# Patient Record
Sex: FEMALE | Race: WHITE | HISPANIC OR LATINO | Employment: UNEMPLOYED | URBAN - METROPOLITAN AREA
[De-identification: names, ages, dates, MRNs, and addresses within clinical notes are randomized per-mention and may not be internally consistent; named-entity substitution may affect disease eponyms.]

---

## 2023-05-17 ENCOUNTER — OFFICE VISIT (OUTPATIENT)
Dept: PRIMARY CARE CLINIC | Facility: CLINIC | Age: 58
End: 2023-05-17
Payer: OTHER GOVERNMENT

## 2023-05-17 VITALS
WEIGHT: 143.88 LBS | TEMPERATURE: 97 F | BODY MASS INDEX: 25.49 KG/M2 | SYSTOLIC BLOOD PRESSURE: 100 MMHG | HEIGHT: 63 IN | HEART RATE: 73 BPM | RESPIRATION RATE: 16 BRPM | DIASTOLIC BLOOD PRESSURE: 62 MMHG | OXYGEN SATURATION: 96 %

## 2023-05-17 DIAGNOSIS — Z87.09: ICD-10-CM

## 2023-05-17 DIAGNOSIS — Z72.0 TOBACCO ABUSE: ICD-10-CM

## 2023-05-17 DIAGNOSIS — F32.A DEPRESSION, UNSPECIFIED DEPRESSION TYPE: ICD-10-CM

## 2023-05-17 DIAGNOSIS — Z11.59 NEED FOR HEPATITIS C SCREENING TEST: ICD-10-CM

## 2023-05-17 DIAGNOSIS — R00.2 PALPITATIONS: ICD-10-CM

## 2023-05-17 DIAGNOSIS — E74.819 DISORDERS OF GLUCOSE TRANSPORT, UNSPECIFIED: ICD-10-CM

## 2023-05-17 DIAGNOSIS — F41.1 GENERALIZED ANXIETY DISORDER: ICD-10-CM

## 2023-05-17 DIAGNOSIS — Z00.00 ANNUAL PHYSICAL EXAM: ICD-10-CM

## 2023-05-17 DIAGNOSIS — Z78.0 MENOPAUSE: ICD-10-CM

## 2023-05-17 DIAGNOSIS — Z11.4 ENCOUNTER FOR SCREENING FOR HIV: ICD-10-CM

## 2023-05-17 DIAGNOSIS — Z76.89 ENCOUNTER TO ESTABLISH CARE: Primary | ICD-10-CM

## 2023-05-17 DIAGNOSIS — G47.00 INSOMNIA, UNSPECIFIED TYPE: ICD-10-CM

## 2023-05-17 DIAGNOSIS — R23.2 HOT FLASHES: ICD-10-CM

## 2023-05-17 PROCEDURE — 99999 PR PBB SHADOW E&M-NEW PATIENT-LVL V: ICD-10-PCS | Mod: PBBFAC,,, | Performed by: STUDENT IN AN ORGANIZED HEALTH CARE EDUCATION/TRAINING PROGRAM

## 2023-05-17 PROCEDURE — 93010 ELECTROCARDIOGRAM REPORT: CPT | Mod: S$PBB,,, | Performed by: INTERNAL MEDICINE

## 2023-05-17 PROCEDURE — 99204 OFFICE O/P NEW MOD 45 MIN: CPT | Mod: S$PBB,,, | Performed by: STUDENT IN AN ORGANIZED HEALTH CARE EDUCATION/TRAINING PROGRAM

## 2023-05-17 PROCEDURE — 93010 EKG 12-LEAD: ICD-10-PCS | Mod: S$PBB,,, | Performed by: INTERNAL MEDICINE

## 2023-05-17 PROCEDURE — 99204 PR OFFICE/OUTPT VISIT, NEW, LEVL IV, 45-59 MIN: ICD-10-PCS | Mod: S$PBB,,, | Performed by: STUDENT IN AN ORGANIZED HEALTH CARE EDUCATION/TRAINING PROGRAM

## 2023-05-17 PROCEDURE — 93005 ELECTROCARDIOGRAM TRACING: CPT | Mod: PBBFAC,PN | Performed by: INTERNAL MEDICINE

## 2023-05-17 PROCEDURE — 99999 PR PBB SHADOW E&M-NEW PATIENT-LVL V: CPT | Mod: PBBFAC,,, | Performed by: STUDENT IN AN ORGANIZED HEALTH CARE EDUCATION/TRAINING PROGRAM

## 2023-05-17 PROCEDURE — 99205 OFFICE O/P NEW HI 60 MIN: CPT | Mod: PBBFAC,PN | Performed by: STUDENT IN AN ORGANIZED HEALTH CARE EDUCATION/TRAINING PROGRAM

## 2023-05-17 RX ORDER — FLUOXETINE HYDROCHLORIDE 20 MG/1
20 CAPSULE ORAL EVERY MORNING
COMMUNITY
Start: 2023-04-21

## 2023-05-17 RX ORDER — BUSPIRONE HYDROCHLORIDE 10 MG/1
10 TABLET ORAL 2 TIMES DAILY
COMMUNITY
Start: 2023-01-09

## 2023-05-17 RX ORDER — ALPRAZOLAM 1 MG/1
1 TABLET ORAL NIGHTLY PRN
COMMUNITY
Start: 2023-04-10 | End: 2023-05-17

## 2023-05-17 RX ORDER — ALPRAZOLAM 1 MG/1
TABLET ORAL
Qty: 24 TABLET | Refills: 0 | Status: SHIPPED | OUTPATIENT
Start: 2023-05-17

## 2023-05-17 NOTE — PATIENT INSTRUCTIONS
SELINA/Depression:   - patient has significant and poorly controlled anxiety.   - she demonstrates difficulty controlling stress.  Eminates a litany of stressors in her life during interview.  Speaks of stress/anxiety about 's valentina during his appt; about her health during her appt.  Speaks of stress/anxiety relating to her parents/upbringing, in her previous marriages, in her kids step-parents or biological parents.    - Stress is affecting her with GI manifestations, palpitations, insomnia.  Has scattered thoughts.  Shows difficulty remaining on a point during interview.   - states previous psychiatrist in [place] told her she was doing well, would advise return to quality therapy to get back to doing well.    - taking prozac 20mg daily, Buspar 10mg BID.    - has been taking Xanax 1mg to 0.5mg nightly for sleep.    - advised against long term or daily Benzo use as rarely effective over long term.  But possibly helpful as PRN use for panic issues, which is what she was advised to use med for in this appt.    Chest Pain/palpitations:   - having intermittent chest pain.   - EKG reassuring.  High anxiety which may be related.   - will first check thyroid and cortisol. Consider Holter or Stress test in future.    - ddx: anxiety, angina, arrhythmia, GERD, costochondritis, hyperthyroidism.    Insomnia:   - patient has poor sleep schedule.  Interrupted, drinking caffine in middle of sleep cycle, watching TV in sleep cycle.     - high anxiety contributing.    - suggest complete re-working of her approach to sleep.   - states does not want a sedative med for sleep; yet has been using Xanax nightly.     - advised will not be giving xanax for nightly use.  Last fill was on 4/10/23.   - patient states melatonin does not work, which is not surprising given hx of regular benzo use.      Hx Lung Lesion:   - patient states was previosly found to have lung findings that had been addressed.  Current smoker, would advise getting  low dose CT.    Hot Flashes/menopause:   - referral to OB to discuss menopause.   - having hot/cold flashes.   - would recommend continued use of SSRI as can be helpful.    Tobacco use:   - advise cessation.    SLEEP HYGIENE-   After 3 pm - Avoid caffeine (coffee, tea, soft drinks ,energy drinks)   After 6 pm - Avoid liquids (so you're not waking up to urinate)  After 8 pm - Avoid screens that emit light & stimulate your brain. Don't look at phone, computer or TV  It's ok to listen to background noise machine (waves, rain, audio books where voice is low)  Keep your room dark & use an alarm clock that isn't bright.   Do not take a nap  Exercise your body for 20 minutes EVERY DAY , so it's exhausted to sleep    Try to stay in your bed for 7-8 hours per night, instead of getting out of bed (which awakens your body when you're standing upright &  wakes up your brain, affecting your normal circadian rhythm)  ================    We are no longer prescribing sleep medications - Ambien, Lunesta, Sonata, Restoril -- because of studies showing a possible increased risk of death.     You can try these safe over the counter sleep aids-   Tranquil Sleep by Sleep Relax (5HTP, Suntheanine & Melatonin),  Prosom, Tylenol PM, Melatonin. Aerobic exercise helps to exhaust the body & relax into deeper sleep.     Please make an appointment if you'd like to discuss a prescription medications that could also be helpful such as Trazodone . It is a low dose of serotonin medication (similar to Lexapro, Celexa, Zoloft, Prozac) , mainly helping to calm our thoughts & worries & lists of things going through our head that keep us from either going to sleep or sleeping restfully

## 2023-05-17 NOTE — PROGRESS NOTES
Subjective:           Patient ID: Lida Palumbo is a 57 y.o. female who presents today with a chief complaint of est care, annual exam.    Chief Complaint:   Establish Care and Annual Exam      History of Present Illness:    56yo female presenting to est care.    Hx anxiety.  Hx of Buspar, Prozac and Xanax use.  As well as individual therapy per her last provider notes.   Has seen a psychiatrist before, states felt was doing well.      Appt with [facility] shows rx addressing anxiety and reactive depression; including Fluoxitine (Prozac) 20mg daily in AM, Buspar 10mg BID, and Xanax 1mg PRN for sleep.    Patient states she takes 1/2 a tablet in the evening.  She gets to bed at 2AM and up by 4:30AM.  Watches TV to 8AM w/ coffee and sleeps from 8-9:30AM.    States that she has been a bit more weepy or emotional person.    Which is not typical for her.     Has 6 kids between patient and spouse.  Has lots of drama between the kids.     States gets GERD, GI upset with stress.    Does feel that anxiety worsens these issues.     States was  4 times and  3 times.  But doing well now.     Has been having hot flashes, causing more irritation.     Gets heart palpitations with blurry vision and shakes.       Review of Systems   Constitutional:  Positive for diaphoresis. Negative for activity change, fatigue, fever and unexpected weight change.        Hot flashes   HENT:  Negative for congestion, nosebleeds, sinus pressure and sneezing.    Respiratory:  Negative for cough, shortness of breath and wheezing.    Cardiovascular:  Positive for chest pain (at times) and palpitations. Negative for leg swelling.   Gastrointestinal:  Positive for abdominal pain. Negative for abdominal distention, constipation, diarrhea and nausea.   Genitourinary:  Negative for difficulty urinating and dysuria.   Musculoskeletal:  Negative for back pain and gait problem.   Skin:  Negative for pallor and rash.   Neurological:   "Negative for weakness, numbness and headaches.   Psychiatric/Behavioral:  Positive for agitation, decreased concentration and sleep disturbance. Negative for self-injury and suicidal ideas. The patient is nervous/anxious.          Objective:        Vitals:    05/17/23 1347   BP: 100/62   BP Location: Right arm   Patient Position: Sitting   BP Method: Medium (Manual)   Pulse: 73   Resp: 16   Temp: 97.4 °F (36.3 °C)   TempSrc: Temporal   SpO2: 96%   Weight: 65.2 kg (143 lb 13.6 oz)   Height: 5' 3" (1.6 m)       Body mass index is 25.48 kg/m².        Physical Exam  Constitutional:       General: She is not in acute distress.     Appearance: Normal appearance. She is not ill-appearing.   HENT:      Head: Normocephalic and atraumatic.      Right Ear: External ear normal.      Left Ear: External ear normal.      Nose: No rhinorrhea.      Mouth/Throat:      Mouth: Mucous membranes are moist.      Pharynx: Posterior oropharyngeal erythema (mild) present.   Eyes:      Extraocular Movements: Extraocular movements intact.      Conjunctiva/sclera: Conjunctivae normal.   Cardiovascular:      Rate and Rhythm: Normal rate and regular rhythm.      Pulses: Normal pulses.      Heart sounds: No murmur heard.  Pulmonary:      Effort: Pulmonary effort is normal. No respiratory distress.      Breath sounds: No wheezing.   Abdominal:      Tenderness: There is no abdominal tenderness. There is no right CVA tenderness or left CVA tenderness.   Musculoskeletal:      Right lower leg: No edema.      Left lower leg: No edema.   Lymphadenopathy:      Cervical: No cervical adenopathy.   Skin:     Capillary Refill: Capillary refill takes less than 2 seconds.      Coloration: Skin is not jaundiced.      Findings: No bruising.      Comments: Single 1mm longitudinal line of darker coloration on right great toe nail.   Neurological:      General: No focal deficit present.      Mental Status: She is alert and oriented to person, place, and time.      " Motor: No weakness.      Gait: Gait normal.   Psychiatric:      Comments: Patient speaking of stressors and anxiety constantly.  Is anxious about her health, husbands health.  Stress/anxiety affect her sleep, her digestion, her heart rate.  Has familial issues that cause stress and are caused by stress.      Relates long line of issues related to her stress through the appt.            No results found for: NA, K, CL, CO2, BUN, CREATININE, GLUCOSE, ANIONGAP  Lab Results   Component Value Date    HGBA1C 5.5 09/26/2022     No results found for: BNP, BNPTRIAGEBLO    No results found for: WBC, HGB, HCT, PLT, GRAN  No results found for: CHOL, HDL, LDLCALC, TRIG       Current Outpatient Medications:     busPIRone (BUSPAR) 10 MG tablet, Take 10 mg by mouth 2 (two) times a day., Disp: , Rfl:     FLUoxetine 20 MG capsule, Take 20 mg by mouth every morning., Disp: , Rfl:     ALPRAZolam (XANAX) 1 MG tablet, Use up to 8 doses per month, 24 doses for 3 month supply., Disp: 24 tablet, Rfl: 0     Outpatient Encounter Medications as of 5/17/2023   Medication Sig Dispense Refill    busPIRone (BUSPAR) 10 MG tablet Take 10 mg by mouth 2 (two) times a day.      FLUoxetine 20 MG capsule Take 20 mg by mouth every morning.      [DISCONTINUED] ALPRAZolam (XANAX) 1 MG tablet Take 1 mg by mouth nightly as needed.      ALPRAZolam (XANAX) 1 MG tablet Use up to 8 doses per month, 24 doses for 3 month supply. 24 tablet 0     No facility-administered encounter medications on file as of 5/17/2023.          Assessment:       1. Encounter to establish care    2. Palpitations    3. Annual physical exam    4. Generalized anxiety disorder    5. Insomnia, unspecified type    6. Tobacco abuse    7. Personal history of lung disease    8. Depression, unspecified depression type    9. Hot flashes    10. 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 Encounter for screening for HIV           Plan:        Encounter to establish care  -     EKG 12-lead    Palpitations  -     CBC Auto Differential; Future; Expected date: 05/17/2023  -     Comprehensive Metabolic Panel; Future; Expected date: 05/17/2023  -     Lipid Panel; Future; Expected date: 05/17/2023  -     Hemoglobin A1C; Future; Expected date: 05/17/2023  -     TSH; Future; Expected date: 05/17/2023  -     T4, Free; Future; Expected date: 05/17/2023  -     CORTISOL, 8AM; Future; Expected date: 05/17/2023    Annual physical exam  -     EKG 12-lead  -     CBC Auto Differential; Future; Expected date: 05/17/2023  -     Comprehensive Metabolic Panel; Future; Expected date: 05/17/2023  -     Lipid Panel; Future; Expected date: 05/17/2023  -     Hemoglobin A1C; Future; Expected date: 05/17/2023  -     TSH; Future; Expected date: 05/17/2023  -     T4, Free; Future; Expected date: 05/17/2023    Generalized anxiety disorder  -     CBC Auto Differential; Future; Expected date: 05/17/2023  -     Comprehensive Metabolic Panel; Future; Expected date: 05/17/2023  -     Lipid Panel; Future; Expected date: 05/17/2023  -     TSH; Future; Expected date: 05/17/2023  -     T4, Free; Future; Expected date: 05/17/2023  -     CORTISOL, 8AM; Future; Expected date: 05/17/2023  -     ALPRAZolam (XANAX) 1 MG tablet; Use up to 8 doses per month, 24 doses for 3 month supply.  Dispense: 24 tablet; Refill: 0  -     Ambulatory referral/consult to Psychiatry; Future; Expected date: 05/24/2023    Insomnia, unspecified type  -     TSH; Future; Expected date: 05/17/2023  -     T4, Free; Future; Expected date: 05/17/2023  -     CORTISOL, 8AM; Future; Expected date: 05/17/2023  -     ALPRAZolam (XANAX) 1 MG tablet; Use up to 8 doses per month, 24 doses for 3 month supply.  Dispense: 24 tablet; Refill: 0  -     Ambulatory referral/consult to Psychiatry; Future; Expected date: 05/24/2023    Tobacco abuse  -     CT Chest Lung Screening Low Dose; Future; Expected date: 05/17/2023    Personal history  of lung disease    Depression, unspecified depression type    Hot flashes    Need for hepatitis C screening test  -     Hepatitis C Antibody; Future; Expected date: 05/17/2023    Disorders of glucose transport, unspecified  -     Hemoglobin A1C; Future; Expected date: 05/17/2023    Encounter for screening for HIV  -     HIV 1/2 Ag/Ab (4th Gen); Future; Expected date: 05/17/2023               SELINA/Depression:   - patient has significant and poorly controlled anxiety.   - she demonstrates difficulty controlling stress.  Eminates a litany of stressors in her life during interview.  Speaks of stress/anxiety about 's valentina during his appt; about her health during her appt.  Speaks of stress/anxiety relating to her parents/upbringing, in her previous marriages, in her kids step-parents or biological parents.    - Stress is affecting her with GI manifestations, palpitations, insomnia.  Has scattered thoughts.  Shows difficulty remaining on a point during interview.   - states previous psychiatrist in [place] told her she was doing well, would advise return to quality therapy to get back to doing well.    - taking prozac 20mg daily, Buspar 10mg BID.    - has been taking Xanax 1mg to 0.5mg nightly for sleep.    - advised against long term or daily Benzo use as rarely effective over long term.  But possibly helpful as PRN use for panic issues, which is what she was advised to use med for in this appt.    Chest Pain/palpitations:   - having intermittent chest pain.   - EKG reassuring.  High anxiety which may be related.   - will first check thyroid and cortisol. Consider Holter or Stress test in future.    - ddx: anxiety, angina, arrhythmia, GERD, costochondritis, hyperthyroidism.    Insomnia:   - patient has poor sleep schedule.  Interrupted, drinking caffine in middle of sleep cycle, watching TV in sleep cycle.     - high anxiety contributing.    - suggest complete re-working of her approach to sleep.   - states does not  want a sedative med for sleep; yet has been using Xanax nightly.     - advised will not be giving xanax for nightly use.  Last fill was on 4/10/23.   - patient states melatonin does not work, which is not surprising given hx of regular benzo use.      Hx Lung Lesion:   - patient states was previosly found to have lung findings that had been addressed.  Current smoker, would advise getting low dose CT.    Hot Flashes/menopause:   - referral to OB to discuss menopause.   - having hot/cold flashes.   - would recommend continued use of SSRI as can be helpful.    Tobacco use:   - advise cessation.    SLEEP HYGIENE-   After 3 pm - Avoid caffeine (coffee, tea, soft drinks ,energy drinks)   After 6 pm - Avoid liquids (so you're not waking up to urinate)  After 8 pm - Avoid screens that emit light & stimulate your brain. Don't look at phone, computer or TV  It's ok to listen to background noise machine (waves, rain, audio books where voice is low)  Keep your room dark & use an alarm clock that isn't bright.   Do not take a nap  Exercise your body for 20 minutes EVERY DAY , so it's exhausted to sleep    Try to stay in your bed for 7-8 hours per night, instead of getting out of bed (which awakens your body when you're standing upright &  wakes up your brain, affecting your normal circadian rhythm)  ================    We are no longer prescribing sleep medications - Ambien, Lunesta, Sonata, Restoril -- because of studies showing a possible increased risk of death.     You can try these safe over the counter sleep aids-   Tranquil Sleep by Sleep Relax (5HTP, Suntheanine & Melatonin),  Prosom, Tylenol PM, Melatonin. Aerobic exercise helps to exhaust the body & relax into deeper sleep.     Please make an appointment if you'd like to discuss a prescription medications that could also be helpful such as Trazodone . It is a low dose of serotonin medication (similar to Lexapro, Celexa, Zoloft, Prozac) , mainly helping to calm our  thoughts & worries & lists of things going through our head that keep us from either going to sleep or sleeping restfully

## 2023-06-22 DIAGNOSIS — Z12.31 OTHER SCREENING MAMMOGRAM: ICD-10-CM

## 2023-08-03 ENCOUNTER — PATIENT OUTREACH (OUTPATIENT)
Dept: ADMINISTRATIVE | Facility: HOSPITAL | Age: 58
End: 2023-08-03
Payer: OTHER GOVERNMENT

## 2023-08-03 NOTE — PROGRESS NOTES
Health Maintenance Due   Topic Date Due    Hepatitis C Screening  Never done    Cervical Cancer Screening  Never done    COVID-19 Vaccine (1) Never done    Pneumococcal Vaccines (Age 0-64) (1 - PCV) Never done    HIV Screening  Never done    TETANUS VACCINE  Never done    Mammogram  Never done    Colorectal Cancer Screening  Never done    LDCT Lung Screen  Never done    Shingles Vaccine (1 of 2) Never done        Chart review done.   HM updated.   Immunizations reviewed & updated.   Care Everywhere updated.   [facility] reviewed   DIS reviewed

## 2024-02-06 DIAGNOSIS — Z12.11 COLON CANCER SCREENING: ICD-10-CM

## 2025-07-16 ENCOUNTER — OFFICE VISIT (OUTPATIENT)
Dept: FAMILY MEDICINE | Facility: CLINIC | Age: 60
End: 2025-07-16
Payer: OTHER GOVERNMENT

## 2025-07-16 VITALS
SYSTOLIC BLOOD PRESSURE: 110 MMHG | DIASTOLIC BLOOD PRESSURE: 74 MMHG | WEIGHT: 138 LBS | HEART RATE: 96 BPM | BODY MASS INDEX: 25.4 KG/M2 | OXYGEN SATURATION: 98 % | HEIGHT: 62 IN | TEMPERATURE: 99 F | RESPIRATION RATE: 18 BRPM

## 2025-07-16 DIAGNOSIS — F32.A DEPRESSION, UNSPECIFIED DEPRESSION TYPE: Primary | ICD-10-CM

## 2025-07-16 DIAGNOSIS — E55.9 VITAMIN D DEFICIENCY: ICD-10-CM

## 2025-07-16 DIAGNOSIS — R91.8 LUNG NODULE, MULTIPLE: ICD-10-CM

## 2025-07-16 DIAGNOSIS — F41.1 GENERALIZED ANXIETY DISORDER: ICD-10-CM

## 2025-07-16 DIAGNOSIS — G47.00 INSOMNIA, UNSPECIFIED TYPE: ICD-10-CM

## 2025-07-16 DIAGNOSIS — Z79.899 ON LONG TERM DRUG THERAPY: ICD-10-CM

## 2025-07-16 DIAGNOSIS — Z12.4 SCREENING FOR MALIGNANT NEOPLASM OF CERVIX: ICD-10-CM

## 2025-07-16 DIAGNOSIS — Z98.84 HISTORY OF BARIATRIC SURGERY: ICD-10-CM

## 2025-07-16 DIAGNOSIS — R91.8 OPACITY OF LUNG ON IMAGING STUDY: ICD-10-CM

## 2025-07-16 DIAGNOSIS — T85.49XA DEFLATION OF BREAST IMPLANT, INITIAL ENCOUNTER: ICD-10-CM

## 2025-07-16 DIAGNOSIS — Z12.11 SCREENING FOR MALIGNANT NEOPLASM OF COLON: ICD-10-CM

## 2025-07-16 DIAGNOSIS — Z72.0 TOBACCO ABUSE: ICD-10-CM

## 2025-07-16 LAB
ABS NRBC COUNT: 0 X 10 3/UL (ref 0–0.01)
ABSOLUTE BASOPHIL: 0.06 X 10 3/UL (ref 0–0.22)
ABSOLUTE EOSINOPHIL: 0.13 X 10 3/UL (ref 0.04–0.54)
ABSOLUTE IMMATURE GRAN: 0.06 X 10 3/UL (ref 0–0.04)
ABSOLUTE LYMPHOCYTE: 1.84 X 10 3/UL (ref 0.86–4.75)
ABSOLUTE MONOCYTE: 0.7 X 10 3/UL (ref 0.22–1.08)
ALBUMIN SERPL-MCNC: 4.1 G/DL (ref 3.5–5.2)
ALBUMIN/GLOB SERPL ELPH: 1.6 {RATIO} (ref 1–2.7)
ALP ISOS SERPL LEV INH-CCNC: 113 U/L (ref 35–105)
ALT (SGPT): 9 U/L (ref 0–33)
ANION GAP SERPL CALC-SCNC: 12 MMOL/L (ref 8–17)
AST SERPL-CCNC: 12 U/L (ref 0–32)
BASOPHILS NFR BLD: 0.5 % (ref 0.2–1.2)
BILIRUBIN, TOTAL: 0.29 MG/DL (ref 0–1.2)
BUN/CREAT SERPL: 23.3 (ref 6–20)
CALCIUM SERPL-MCNC: 9.5 MG/DL (ref 8.6–10.2)
CARBON DIOXIDE, CO2: 26 MMOL/L (ref 22–29)
CHLORIDE: 103 MMOL/L (ref 98–107)
CHOLEST SERPL-MCNC: 66 MG/DL (ref 0–150)
CHOLEST SERPL-MSCNC: 164 MG/DL (ref 100–200)
CREAT SERPL-MCNC: 0.7 MG/DL (ref 0.5–0.9)
EOSINOPHIL NFR BLD: 1.1 % (ref 0.7–7)
ESTIMATED AVERAGE GLUCOSE: 111 MG/DL (ref 70–126)
GFR ESTIMATION: 100.19 ML/MIN/1.73M2
GLOBULIN: 2.6 G/DL (ref 1.5–4.5)
GLUCOSE: 85 MG/DL (ref 74–106)
HBA1C MFR BLD: 5.5 % (ref 4–5.6)
HCT VFR BLD AUTO: 40.5 % (ref 37–47)
HDLC SERPL-MCNC: 59 MG/DL
HGB BLD-MCNC: 13.1 G/DL (ref 12–16)
IMMATURE GRANULOCYTES: 0.5 % (ref 0–0.5)
LDL/HDL RATIO: 1.6 (ref 1–3)
LDLC SERPL CALC-MCNC: 91.8 MG/DL (ref 0–100)
LYMPHOCYTES NFR BLD: 16 % (ref 19.3–53.1)
MCH RBC QN AUTO: 29.2 PG (ref 27–32)
MCHC RBC AUTO-ENTMCNC: 32.3 G/DL (ref 32–36)
MCV RBC AUTO: 90.4 FL (ref 82–100)
MONOCYTES NFR BLD: 6.1 % (ref 4.7–12.5)
NEUTROPHILS # BLD AUTO: 8.68 X 10 3/UL (ref 2.15–7.56)
NEUTROPHILS NFR BLD: 75.8 % (ref 34–71.1)
NUCLEATED RED BLOOD CELLS: 0 /100 WBC (ref 0–0.2)
PLATELET # BLD AUTO: 259 X 10 3/UL (ref 135–400)
POTASSIUM: 4.3 MMOL/L (ref 3.5–5.1)
PROT SNV-MCNC: 6.7 G/DL (ref 6.4–8.3)
RBC # BLD AUTO: 4.48 X 10 6/UL (ref 4.2–5.4)
RDW-SD: 48.1 FL (ref 37–54)
SODIUM: 141 MMOL/L (ref 136–145)
TSH W/REFLEX TO FT4: 1.17 UIU/ML (ref 0.27–4.2)
UREA NITROGEN (BUN): 16.3 MG/DL (ref 6–20)
VITAMIN D (25OHD): 33.9 NG/ML
WBC # BLD: 11.47 X 10 3/UL (ref 4.3–10.8)

## 2025-07-16 NOTE — PROGRESS NOTES
Subjective:      Patient ID: Lida Palumbo is a 60 y.o. female.    Chief Complaint: Establish Care      HPI:  60-year-old female presents today to establish care.  Does not need refills on her current medication.  Has a history of anxiety and OCD tendencies.  She does take Xanax as needed for sleep.  Has been on this for several years.  She does have a history of bariatric surgery.  Has also had breast implants.  States when his leaking on her.  She would like to get this fixed.  Has had problems with her bariatric surgery.  Would also like to get this reversed.  Request referral.  Has times where she can not tolerate food.  She does smoke.  Denies any shortness a breath or wheezing.  She does get aspiration pneumonia from time to time.  Went to the ER for this last July.  Not interested in mammogram at this time.  Would like to get her implants fixed .  Will plan for Pap smear at a later date.  Denies any family history of colon cancer.  Has never had a colonoscopy.    Past Medical History:   Diagnosis Date    Anxiety     Depression      Past Surgical History:   Procedure Laterality Date     SECTION      CHOLECYSTECTOMY      INSERTION OF BREAST IMPLANT      KNEE SURGERY Left     LAPAROSCOPIC GASTRIC BANDING      tumor tailbone removed       Family History   Problem Relation Name Age of Onset    Diabetes Mother      Bladder Cancer Mother          met to lungs    Skin cancer Mother      Other Father          substance abuse hx; NA, AA    Prostate cancer Father      Heart failure Father      Coronary artery disease Father      Stroke Maternal Grandmother          several    Heart attack Maternal Grandmother          several    Heart disease Paternal Grandmother      Heart disease Paternal Grandfather      Colon cancer Neg Hx      Lung cancer Neg Hx      Breast cancer Neg Hx       Social History[1]  Review of patient's allergies indicates:   Allergen Reactions    Codeine Nausea And Vomiting    Iodine  "Anaphylaxis    Sulfa (sulfonamide antibiotics) Itching    Flucytosine        Review of Systems   Constitutional:  Negative for activity change, appetite change, fatigue, fever and unexpected weight change.   HENT:  Negative for sinus pain.    Respiratory:  Negative for cough, shortness of breath and wheezing.    Cardiovascular:  Negative for chest pain.   Gastrointestinal:  Negative for abdominal pain, nausea and vomiting.   Genitourinary:  Negative for difficulty urinating.   Musculoskeletal:  Negative for arthralgias and myalgias.   Neurological:  Negative for dizziness and headaches.   Psychiatric/Behavioral:  The patient is not nervous/anxious.        Objective:       /74 (BP Location: Left arm, Patient Position: Sitting)   Pulse 96   Temp 98.6 °F (37 °C) (Oral)   Resp 18   Ht 5' 2" (1.575 m)   Wt 62.6 kg (138 lb)   SpO2 98%   BMI 25.24 kg/m²   Physical Exam  Vitals and nursing note reviewed.   Constitutional:       Appearance: Normal appearance. She is well-developed.   HENT:      Head: Normocephalic and atraumatic.   Eyes:      Extraocular Movements: Extraocular movements intact.      Conjunctiva/sclera: Conjunctivae normal.      Pupils: Pupils are equal, round, and reactive to light.   Cardiovascular:      Rate and Rhythm: Normal rate and regular rhythm.      Heart sounds: Normal heart sounds.   Pulmonary:      Effort: Pulmonary effort is normal.      Breath sounds: Wheezing present.   Abdominal:      Palpations: Abdomen is soft.   Musculoskeletal:         General: Normal range of motion.      Cervical back: Normal range of motion and neck supple.   Skin:     General: Skin is warm and dry.   Neurological:      General: No focal deficit present.      Mental Status: She is alert and oriented to person, place, and time.   Psychiatric:         Mood and Affect: Mood normal.         Assessment:     1. Depression, unspecified depression type    2. Generalized anxiety disorder    3. Insomnia, unspecified " type    4. Deflation of breast implant, initial encounter    5. History of bariatric surgery    6. Screening for malignant neoplasm of colon    7. Lung nodule, multiple    8. Opacity of lung on imaging study    9. Screening for malignant neoplasm of cervix    10. Tobacco abuse    11. On long term drug therapy    12. Vitamin D deficiency        Plan:   Depression, unspecified depression type    Generalized anxiety disorder    Insomnia, unspecified type    Deflation of breast implant, initial encounter  -     Ambulatory referral/consult to Plastic Surgery; Future; Expected date: 07/23/2025    History of bariatric surgery  -     Ambulatory referral/consult to Bariatric Surgery; Future; Expected date: 07/23/2025    Screening for malignant neoplasm of colon  -     Cologuard Screening (Multitarget Stool DNA); Future; Expected date: 07/16/2025    Lung nodule, multiple  -     CT Chest Without Contrast; Future; Expected date: 07/16/2025    Opacity of lung on imaging study  -     CT Chest Without Contrast; Future; Expected date: 07/16/2025    Screening for malignant neoplasm of cervix    Tobacco abuse    On long term drug therapy  -     CBC Auto Differential; Future; Expected date: 07/16/2025  -     Comprehensive Metabolic Panel; Future; Expected date: 07/16/2025  -     Hemoglobin A1C; Future; Expected date: 07/16/2025  -     Lipid Panel; Future; Expected date: 07/16/2025  -     TSH w/reflex to Free T4; Future; Expected date: 07/16/2025    Vitamin D deficiency  -     Vitamin D; Future; Expected date: 07/16/2025      Continue current meds.  Can call for refills when needed.      Bariatric surgery and plastic surgery referral pending.      Cologuard ordered.      Reviewed previous imaging.  Repeat chest CT pending.  Will avoid contrast at this time.  May consider based on findings of CT.      Will plan for Pap at a later date.      Encouraged tobacco cessation.      Labs pending.      RTC in 3 months.  Sooner if  needed.  Medication List with Changes/Refills   Current Medications    ALPRAZOLAM (XANAX) 1 MG TABLET    Use up to 8 doses per month, 24 doses for 3 month supply.    BUSPIRONE (BUSPAR) 10 MG TABLET    Take 10 mg by mouth 2 (two) times a day.    FLUOXETINE 20 MG CAPSULE    Take 20 mg by mouth every morning.   Discontinued Medications    ALBUTEROL (PROVENTIL/VENTOLIN HFA) 90 MCG/ACTUATION INHALER    Inhale 2 puffs into the lungs every 6 (six) hours as needed. Rescue    AMOXICILLIN-CLAVULANATE 875-125MG (AUGMENTIN) 875-125 MG PER TABLET    Take 1 tablet by mouth 2 (two) times daily.    BUPROPION (WELLBUTRIN XL) 150 MG TB24 TABLET    Take 150 mg by mouth.    CONJUGATED ESTROGENS (PREMARIN) VAGINAL CREAM    Place vaginally.    DEXTROMETHORPHAN-GUAIFENESIN  MG/5 ML (ROBITUSSIN-DM)  MG/5 ML LIQUID    Take 10 mLs by mouth 4 (four) times daily as needed.    LEVOFLOXACIN (LEVAQUIN) 500 MG TABLET    Take 1 tablet (500 mg total) by mouth once daily.    NAPROXEN SODIUM (ANAPROX) 220 MG TABLET    Take 220 mg by mouth.              Disclaimer: This note may have been prepared using voice recognition software, it may have not been extensively proofed, as such there could be errors within the text such as sound alike errors.          [1]   Social History  Socioeconomic History    Marital status:    Tobacco Use    Smoking status: Every Day     Current packs/day: 1.00     Average packs/day: 1 pack/day for 40.0 years (40.0 ttl pk-yrs)     Types: Cigarettes    Smokeless tobacco: Never   Substance and Sexual Activity    Alcohol use: Never    Drug use: Never    Sexual activity: Yes     Partners: Male

## 2025-07-17 ENCOUNTER — PATIENT MESSAGE (OUTPATIENT)
Dept: FAMILY MEDICINE | Facility: CLINIC | Age: 60
End: 2025-07-17
Payer: OTHER GOVERNMENT

## 2025-07-22 ENCOUNTER — OFFICE VISIT (OUTPATIENT)
Dept: FAMILY MEDICINE | Facility: CLINIC | Age: 60
End: 2025-07-22
Payer: OTHER GOVERNMENT

## 2025-07-22 DIAGNOSIS — G47.00 INSOMNIA, UNSPECIFIED TYPE: ICD-10-CM

## 2025-07-22 DIAGNOSIS — N30.90 CYSTITIS: ICD-10-CM

## 2025-07-22 DIAGNOSIS — F41.1 GENERALIZED ANXIETY DISORDER: ICD-10-CM

## 2025-07-22 DIAGNOSIS — F32.A DEPRESSION, UNSPECIFIED DEPRESSION TYPE: Primary | ICD-10-CM

## 2025-07-22 LAB
BILIRUBIN, UA POC OHS: NEGATIVE
BLOOD, UA POC OHS: NEGATIVE
CLARITY, UA POC OHS: CLEAR
COLOR, UA POC OHS: YELLOW
GLUCOSE, UA POC OHS: NEGATIVE
KETONES, UA POC OHS: NEGATIVE
LEUKOCYTES, UA POC OHS: ABNORMAL
NITRITE, UA POC OHS: NEGATIVE
PH, UA POC OHS: 5.5
PROTEIN, UA POC OHS: NEGATIVE
SPECIFIC GRAVITY, UA POC OHS: >=1.03
UROBILINOGEN, UA POC OHS: 0.2

## 2025-07-22 PROCEDURE — 81003 URINALYSIS AUTO W/O SCOPE: CPT | Mod: QW,S$GLB,, | Performed by: FAMILY MEDICINE

## 2025-07-22 PROCEDURE — 99214 OFFICE O/P EST MOD 30 MIN: CPT | Mod: S$GLB,,, | Performed by: FAMILY MEDICINE

## 2025-07-22 RX ORDER — NITROFURANTOIN 25; 75 MG/1; MG/1
100 CAPSULE ORAL 2 TIMES DAILY
Qty: 10 CAPSULE | Refills: 0 | Status: SHIPPED | OUTPATIENT
Start: 2025-07-22

## 2025-07-22 RX ORDER — FLUOXETINE HYDROCHLORIDE 40 MG/1
40 CAPSULE ORAL DAILY
Qty: 90 CAPSULE | Refills: 1 | Status: SHIPPED | OUTPATIENT
Start: 2025-07-22

## 2025-07-22 NOTE — PROGRESS NOTES
Subjective:      Patient ID: Lida Palumbo is a 60 y.o. female.    Chief Complaint: No chief complaint on file.      HPI:  Presents for possible UTI.  Having pelvic pressure and pain.  Having urgency.  No dysuria.  Mild frequency.  Thinks it could be a UTI.  She does complain of anxiety.  Uses Xanax for sleep.  Taking BuSpar 3 times a day.  On 20 mg of fluoxetine.  No side effects.  Does have nausea vomiting that is chronic.    Past Medical History:   Diagnosis Date    Anxiety     Depression      Past Surgical History:   Procedure Laterality Date     SECTION      CHOLECYSTECTOMY      INSERTION OF BREAST IMPLANT      KNEE SURGERY Left     LAPAROSCOPIC GASTRIC BANDING      tumor tailbone removed       Family History   Problem Relation Name Age of Onset    Diabetes Mother      Bladder Cancer Mother          met to lungs    Skin cancer Mother      Other Father          substance abuse hx; NA, AA    Prostate cancer Father      Heart failure Father      Coronary artery disease Father      Stroke Maternal Grandmother          several    Heart attack Maternal Grandmother          several    Heart disease Paternal Grandmother      Heart disease Paternal Grandfather      Colon cancer Neg Hx      Lung cancer Neg Hx      Breast cancer Neg Hx       Social History[1]  Review of patient's allergies indicates:   Allergen Reactions    Codeine Nausea And Vomiting    Iodine Anaphylaxis    Sulfa (sulfonamide antibiotics) Itching    Flucytosine        Review of Systems   Constitutional:  Negative for activity change, appetite change, chills, fatigue and fever.   HENT:  Negative for congestion, ear pain, postnasal drip, rhinorrhea, sinus pressure, sinus pain and sore throat.    Eyes:  Negative for pain and redness.   Respiratory:  Negative for cough, chest tightness and shortness of breath.    Cardiovascular:  Negative for chest pain and leg swelling.   Gastrointestinal:  Negative for abdominal distention, abdominal pain,  constipation, diarrhea, nausea and vomiting.   Endocrine: Negative for cold intolerance and heat intolerance.   Genitourinary:  Positive for frequency, pelvic pain and urgency. Negative for dysuria and hematuria.   Musculoskeletal:  Negative for arthralgias, back pain and joint swelling.   Skin:  Negative for pallor.   Neurological:  Negative for dizziness and light-headedness.   Psychiatric/Behavioral:  Negative for agitation, decreased concentration and hallucinations. The patient is nervous/anxious.        Objective:       There were no vitals taken for this visit.  Physical Exam  Constitutional:       Appearance: She is well-developed.   HENT:      Head: Normocephalic and atraumatic.   Eyes:      Conjunctiva/sclera: Conjunctivae normal.   Pulmonary:      Effort: Pulmonary effort is normal.   Abdominal:      Comments: Negative CVA tenderness bilaterally, mild suprapubic tenderness.   Neurological:      Mental Status: She is alert and oriented to person, place, and time.   Psychiatric:         Behavior: Behavior normal.         Thought Content: Thought content normal.         Judgment: Judgment normal.         Assessment:     1. Depression, unspecified depression type    2. Generalized anxiety disorder    3. Cystitis    4. Insomnia, unspecified type        Plan:   Depression, unspecified depression type  -     FLUoxetine 40 MG capsule; Take 1 capsule (40 mg total) by mouth once daily.  Dispense: 90 capsule; Refill: 1    Generalized anxiety disorder  -     FLUoxetine 40 MG capsule; Take 1 capsule (40 mg total) by mouth once daily.  Dispense: 90 capsule; Refill: 1  -     ALPRAZolam (XANAX) 1 MG tablet; Take 1 tablet (1 mg total) by mouth nightly as needed for Anxiety.  Dispense: 30 tablet; Refill: 3    Cystitis  -     POCT Urinalysis(Instrument)  -     Urine Culture High Risk ($$)  -     nitrofurantoin, macrocrystal-monohydrate, (MACROBID) 100 MG capsule; Take 1 capsule (100 mg total) by mouth 2 (two) times daily.   Dispense: 10 capsule; Refill: 0    Insomnia, unspecified type  -     ALPRAZolam (XANAX) 1 MG tablet; Take 1 tablet (1 mg total) by mouth nightly as needed for Anxiety.  Dispense: 30 tablet; Refill: 3      Send for culture     Treat for possible UTI     Increase fluoxetine.    Medication List with Changes/Refills   New Medications    NITROFURANTOIN, MACROCRYSTAL-MONOHYDRATE, (MACROBID) 100 MG CAPSULE    Take 1 capsule (100 mg total) by mouth 2 (two) times daily.   Current Medications    BUSPIRONE (BUSPAR) 10 MG TABLET    Take 10 mg by mouth 2 (two) times a day.   Changed and/or Refilled Medications    Modified Medication Previous Medication    ALPRAZOLAM (XANAX) 1 MG TABLET ALPRAZolam (XANAX) 1 MG tablet       Take 1 tablet (1 mg total) by mouth nightly as needed for Anxiety.    Use up to 8 doses per month, 24 doses for 3 month supply.    FLUOXETINE 40 MG CAPSULE FLUoxetine 20 MG capsule       Take 1 capsule (40 mg total) by mouth once daily.    Take 20 mg by mouth every morning.            Disclaimer: This note may have been prepared using voice recognition software, it may have not been extensively proofed, as such there could be errors within the text such as sound alike errors.          [1]   Social History  Socioeconomic History    Marital status:    Tobacco Use    Smoking status: Every Day     Current packs/day: 1.00     Average packs/day: 1 pack/day for 40.0 years (40.0 ttl pk-yrs)     Types: Cigarettes    Smokeless tobacco: Never   Substance and Sexual Activity    Alcohol use: Never    Drug use: Never    Sexual activity: Yes     Partners: Male

## 2025-07-23 ENCOUNTER — PATIENT MESSAGE (OUTPATIENT)
Dept: FAMILY MEDICINE | Facility: CLINIC | Age: 60
End: 2025-07-23
Payer: OTHER GOVERNMENT

## 2025-07-23 LAB — URINE CULTURE, ROUTINE: NORMAL

## 2025-07-23 RX ORDER — ALPRAZOLAM 1 MG/1
1 TABLET ORAL NIGHTLY PRN
Qty: 30 TABLET | Refills: 3 | Status: SHIPPED | OUTPATIENT
Start: 2025-07-23

## 2025-07-24 ENCOUNTER — PATIENT MESSAGE (OUTPATIENT)
Dept: FAMILY MEDICINE | Facility: CLINIC | Age: 60
End: 2025-07-24
Payer: OTHER GOVERNMENT

## 2025-07-24 DIAGNOSIS — N30.90 CYSTITIS: Primary | ICD-10-CM

## 2025-07-24 RX ORDER — AMOXICILLIN 500 MG/1
500 TABLET, FILM COATED ORAL EVERY 8 HOURS
Qty: 15 TABLET | Refills: 0 | Status: SHIPPED | OUTPATIENT
Start: 2025-07-24 | End: 2025-08-03

## 2025-07-25 ENCOUNTER — PATIENT MESSAGE (OUTPATIENT)
Dept: FAMILY MEDICINE | Facility: CLINIC | Age: 60
End: 2025-07-25
Payer: OTHER GOVERNMENT

## 2025-07-28 ENCOUNTER — TELEPHONE (OUTPATIENT)
Dept: SURGERY | Facility: CLINIC | Age: 60
End: 2025-07-28
Payer: OTHER GOVERNMENT

## 2025-08-01 NOTE — TELEPHONE ENCOUNTER
Dr Doyle reviewed records- he suggest pt would be better referred to [facility] due to complex history